# Patient Record
Sex: MALE | Race: WHITE | Employment: FULL TIME | URBAN - METROPOLITAN AREA
[De-identification: names, ages, dates, MRNs, and addresses within clinical notes are randomized per-mention and may not be internally consistent; named-entity substitution may affect disease eponyms.]

---

## 2020-01-02 ENCOUNTER — HOSPITAL ENCOUNTER (EMERGENCY)
Age: 45
Discharge: HOME | End: 2020-01-03
Attending: EMERGENCY MEDICINE
Payer: COMMERCIAL

## 2020-01-02 ENCOUNTER — APPOINTMENT (OUTPATIENT)
Dept: CT IMAGING | Age: 45
End: 2020-01-02
Attending: EMERGENCY MEDICINE
Payer: COMMERCIAL

## 2020-01-02 VITALS
HEART RATE: 104 BPM | RESPIRATION RATE: 18 BRPM | SYSTOLIC BLOOD PRESSURE: 121 MMHG | OXYGEN SATURATION: 93 % | WEIGHT: 235 LBS | TEMPERATURE: 97.3 F | HEIGHT: 74 IN | DIASTOLIC BLOOD PRESSURE: 77 MMHG | BODY MASS INDEX: 30.16 KG/M2

## 2020-01-02 DIAGNOSIS — K76.9 HEPATIC LESION: ICD-10-CM

## 2020-01-02 DIAGNOSIS — M51.37 DDD (DEGENERATIVE DISC DISEASE), LUMBOSACRAL: Primary | ICD-10-CM

## 2020-01-02 LAB
AMORPH CRY URNS QL MICRO: ABNORMAL
APPEARANCE UR: ABNORMAL
BACTERIA URNS QL MICRO: NEGATIVE /HPF
BASOPHILS # BLD: 0 K/UL (ref 0–0.1)
BASOPHILS NFR BLD: 1 % (ref 0–1)
BILIRUB UR QL: NEGATIVE
COLOR UR: ABNORMAL
COMMENT, HOLDF: NORMAL
DIFFERENTIAL METHOD BLD: ABNORMAL
EOSINOPHIL # BLD: 0 K/UL (ref 0–0.4)
EOSINOPHIL NFR BLD: 1 % (ref 0–7)
EPITH CASTS URNS QL MICRO: ABNORMAL /LPF
ERYTHROCYTE [DISTWIDTH] IN BLOOD BY AUTOMATED COUNT: 12 % (ref 11.5–14.5)
GLUCOSE UR STRIP.AUTO-MCNC: NEGATIVE MG/DL
HCT VFR BLD AUTO: 47.9 % (ref 36.6–50.3)
HGB BLD-MCNC: 16.8 G/DL (ref 12.1–17)
HGB UR QL STRIP: ABNORMAL
IMM GRANULOCYTES # BLD AUTO: 0 K/UL (ref 0–0.04)
IMM GRANULOCYTES NFR BLD AUTO: 0 % (ref 0–0.5)
KETONES UR QL STRIP.AUTO: NEGATIVE MG/DL
LEUKOCYTE ESTERASE UR QL STRIP.AUTO: NEGATIVE
LYMPHOCYTES # BLD: 1.5 K/UL (ref 0.8–3.5)
LYMPHOCYTES NFR BLD: 28 % (ref 12–49)
MCH RBC QN AUTO: 30.8 PG (ref 26–34)
MCHC RBC AUTO-ENTMCNC: 35.1 G/DL (ref 30–36.5)
MCV RBC AUTO: 87.7 FL (ref 80–99)
MONOCYTES # BLD: 0.7 K/UL (ref 0–1)
MONOCYTES NFR BLD: 13 % (ref 5–13)
NEUTS SEG # BLD: 3.2 K/UL (ref 1.8–8)
NEUTS SEG NFR BLD: 58 % (ref 32–75)
NITRITE UR QL STRIP.AUTO: NEGATIVE
NRBC # BLD: 0 K/UL (ref 0–0.01)
NRBC BLD-RTO: 0 PER 100 WBC
PH UR STRIP: 5 [PH] (ref 5–8)
PLATELET # BLD AUTO: 135 K/UL (ref 150–400)
PROT UR STRIP-MCNC: NEGATIVE MG/DL
RBC # BLD AUTO: 5.46 M/UL (ref 4.1–5.7)
RBC #/AREA URNS HPF: ABNORMAL /HPF (ref 0–5)
SAMPLES BEING HELD,HOLD: NORMAL
SP GR UR REFRACTOMETRY: 1.03 (ref 1–1.03)
UA: UC IF INDICATED,UAUC: ABNORMAL
UROBILINOGEN UR QL STRIP.AUTO: 0.2 EU/DL (ref 0.2–1)
WBC # BLD AUTO: 5.5 K/UL (ref 4.1–11.1)
WBC URNS QL MICRO: ABNORMAL /HPF (ref 0–4)

## 2020-01-02 PROCEDURE — 80053 COMPREHEN METABOLIC PANEL: CPT

## 2020-01-02 PROCEDURE — 96375 TX/PRO/DX INJ NEW DRUG ADDON: CPT

## 2020-01-02 PROCEDURE — 36415 COLL VENOUS BLD VENIPUNCTURE: CPT

## 2020-01-02 PROCEDURE — 96374 THER/PROPH/DIAG INJ IV PUSH: CPT

## 2020-01-02 PROCEDURE — 99282 EMERGENCY DEPT VISIT SF MDM: CPT

## 2020-01-02 PROCEDURE — 85025 COMPLETE CBC W/AUTO DIFF WBC: CPT

## 2020-01-02 PROCEDURE — 74176 CT ABD & PELVIS W/O CONTRAST: CPT

## 2020-01-02 PROCEDURE — 81001 URINALYSIS AUTO W/SCOPE: CPT

## 2020-01-02 RX ORDER — KETOROLAC TROMETHAMINE 30 MG/ML
30 INJECTION, SOLUTION INTRAMUSCULAR; INTRAVENOUS
Status: COMPLETED | OUTPATIENT
Start: 2020-01-02 | End: 2020-01-03

## 2020-01-02 RX ORDER — HYDROMORPHONE HYDROCHLORIDE 1 MG/ML
0.5 INJECTION, SOLUTION INTRAMUSCULAR; INTRAVENOUS; SUBCUTANEOUS
Status: COMPLETED | OUTPATIENT
Start: 2020-01-02 | End: 2020-01-03

## 2020-01-02 RX ORDER — DIAZEPAM 10 MG/2ML
2 INJECTION INTRAMUSCULAR
Status: COMPLETED | OUTPATIENT
Start: 2020-01-02 | End: 2020-01-03

## 2020-01-03 LAB
ALBUMIN SERPL-MCNC: 4 G/DL (ref 3.5–5)
ALBUMIN/GLOB SERPL: 1.1 {RATIO} (ref 1.1–2.2)
ALP SERPL-CCNC: 104 U/L (ref 45–117)
ALT SERPL-CCNC: 55 U/L (ref 12–78)
ANION GAP SERPL CALC-SCNC: 5 MMOL/L (ref 5–15)
AST SERPL-CCNC: 28 U/L (ref 15–37)
BILIRUB SERPL-MCNC: 0.5 MG/DL (ref 0.2–1)
BUN SERPL-MCNC: 10 MG/DL (ref 6–20)
BUN/CREAT SERPL: 9 (ref 12–20)
CALCIUM SERPL-MCNC: 8.7 MG/DL (ref 8.5–10.1)
CHLORIDE SERPL-SCNC: 107 MMOL/L (ref 97–108)
CO2 SERPL-SCNC: 25 MMOL/L (ref 21–32)
CREAT SERPL-MCNC: 1.17 MG/DL (ref 0.7–1.3)
GLOBULIN SER CALC-MCNC: 3.5 G/DL (ref 2–4)
GLUCOSE SERPL-MCNC: 101 MG/DL (ref 65–100)
POTASSIUM SERPL-SCNC: 3.7 MMOL/L (ref 3.5–5.1)
PROT SERPL-MCNC: 7.5 G/DL (ref 6.4–8.2)
SODIUM SERPL-SCNC: 137 MMOL/L (ref 136–145)

## 2020-01-03 PROCEDURE — 74011250636 HC RX REV CODE- 250/636: Performed by: EMERGENCY MEDICINE

## 2020-01-03 RX ORDER — CYCLOBENZAPRINE HCL 10 MG
10 TABLET ORAL
Qty: 30 TAB | Refills: 0 | Status: SHIPPED | OUTPATIENT
Start: 2020-01-03

## 2020-01-03 RX ORDER — HYDROCODONE BITARTRATE AND ACETAMINOPHEN 5; 325 MG/1; MG/1
1-2 TABLET ORAL
Qty: 18 TAB | Refills: 0 | Status: SHIPPED | OUTPATIENT
Start: 2020-01-03 | End: 2020-01-10

## 2020-01-03 RX ORDER — IBUPROFEN 800 MG/1
800 TABLET ORAL
Qty: 30 TAB | Refills: 0 | Status: SHIPPED | OUTPATIENT
Start: 2020-01-03

## 2020-01-03 RX ADMIN — HYDROMORPHONE HYDROCHLORIDE 0.5 MG: 1 INJECTION, SOLUTION INTRAMUSCULAR; INTRAVENOUS; SUBCUTANEOUS at 00:18

## 2020-01-03 RX ADMIN — DIAZEPAM 2 MG: 5 INJECTION, SOLUTION INTRAMUSCULAR; INTRAVENOUS at 00:16

## 2020-01-03 RX ADMIN — SODIUM CHLORIDE 1000 ML: 900 INJECTION, SOLUTION INTRAVENOUS at 00:19

## 2020-01-03 RX ADMIN — KETOROLAC TROMETHAMINE 30 MG: 30 INJECTION, SOLUTION INTRAMUSCULAR at 00:14

## 2020-01-03 NOTE — ED TRIAGE NOTES
Triage: Lower left sided back pain that started yesterday. Denies any injury or falling. Denies N/T. Also reports that when he urinates the stream is less than normal\". Took a hydrocodone and a muscle relaxer reports it helped some but the pain came back again. Denies Hx of kidney stones.

## 2020-01-03 NOTE — DISCHARGE INSTRUCTIONS
Patient Education        Learning About Degenerative Disc Disease  What is degenerative disc disease? Degenerative disc disease is not really a disease. It's a term used to describe the normal changes in your spinal discs as you age. Spinal discs are small, spongy discs that separate the bones (vertebrae) that make up the spine. The discs act as shock absorbers for the spine, so it can flex, bend, and twist.  Degenerative disc disease can take place in one or more places along the spine. It most often occurs in the discs in the lower back and the neck. What causes it? As we age, our spinal discs break down, or degenerate. This breakdown causes the symptoms of degenerative disc disease in some people. When the discs break down, they can lose fluid and dry out, and their outer layers can have tiny cracks or tears. This leads to less padding and less space between the bones in the spine. The body reacts to this by making bony growths on the spine called bone spurs. These spurs can press on the spinal nerve roots or spinal cord. This can cause pain and can affect how well the nerves work. What are the symptoms? Many people with degenerative disc disease have no pain. But others have severe pain or other symptoms that limit their activities. Some of the most common symptoms are:  · Pain in the back or neck. Where the pain occurs depends on which discs are affected. · Pain that gets worse when you move, such as bending over, reaching up, or twisting. · Pain that may occur in the rear end (buttocks), arm, or leg if a nerve is pinched. · Numbness or tingling in your arm or leg. How is it diagnosed? A doctor can often diagnose degenerative disc disease while doing a physical exam. If your exam shows no signs of a serious condition, imaging tests (such as an X-ray) aren't likely to help your doctor find the cause of your symptoms.   Sometimes degenerative disc disease is found when an X-ray is taken for another reason, such as an injury or other health problem. But even if the doctor finds degenerative disc disease, that doesn't always mean that you will have symptoms. How is it treated? There are several things you can do at home to manage pain from this problem. · To relieve pain, use ice or heat (whichever feels better) on the affected area. ¨ Put ice or a cold pack on the area for 10 to 20 minutes at a time. Put a thin cloth between the ice and your skin. ¨ Put a warm water bottle, a heating pad set on low, or a warm cloth on your back. Put a thin cloth between the heating pad and your skin. Do not go to sleep with a heating pad on your skin. · Ask your doctor if you can take acetaminophen (such as Tylenol) or nonsteroidal anti-inflammatory drugs, such as ibuprofen or naproxen. Your doctor can prescribe stronger medicines if needed. Be safe with medicines. Read and follow all instructions on the label. · Get some exercise every day. Exercise is one of the best ways to help your back feel better and stay better. It's best to start each exercise slowly. You may notice a little soreness, and that's okay. But if an exercise makes your pain worse, stop doing it. Here are things you can try:  ¨ Walking. It's the simplest and maybe the best activity for your back. It gets your blood moving and helps your muscles stay strong. ¨ Exercises that gently stretch and strengthen your stomach, back, and leg muscles. The stronger those muscles are, the better they're able to protect your back. If you have constant or severe pain in your back or spine, you may need other treatments, such as physical therapy. In some cases, your doctor may suggest surgery. Follow-up care is a key part of your treatment and safety. Be sure to make and go to all appointments, and call your doctor if you are having problems. It's also a good idea to know your test results and keep a list of the medicines you take. Where can you learn more?   Go to http://jonathan-keron.info/. Enter A067 in the search box to learn more about \"Learning About Degenerative Disc Disease. \"  Current as of: March 21, 2017  Content Version: 11.5  © 9351-8699 [employer], [employer]. Care instructions adapted under license by [employer] (which disclaims liability or warranty for this information). If you have questions about a medical condition or this instruction, always ask your healthcare professional. [place] [address] any warranty or liability for your use of this information.

## 2020-01-03 NOTE — ED PROVIDER NOTES
40 y.o. male with no significant past medical history who presents from home with chief complaint of back pain. The patient reports onset of lower right side back pain that began this afternoon. He denies any injury or fall. He notes he was laying on the couch when his back began to hurt. Since onset, his pain has increased in severity. He describes the pain as a constant ache 8/10 that radiates to his right buttocks and along his right upper leg. He has taken hydrocodone, tramadol, and a muscle relaxer at home with minimal relief. There are no alleviating factors. He also notes decreased urinary output. He states he has been recently ill for the last three days. He denies a history of abdominal surgery. He specifically denies diarrhea, constipation, or hematuria. There are no other acute medical concerns at this time. Social hx: current smoker  Surgical Hx: not on file  NKDA    PCP: UNKNOWN    Note written by Michael Mckeon, as dictated by Jami Levine MD 11:09 PM        The history is provided by the patient. No  was used. History reviewed. No pertinent past medical history. History reviewed. No pertinent surgical history. History reviewed. No pertinent family history.     Social History     Socioeconomic History    Marital status:      Spouse name: Not on file    Number of children: Not on file    Years of education: Not on file    Highest education level: Not on file   Occupational History    Not on file   Social Needs    Financial resource strain: Not on file    Food insecurity:     Worry: Not on file     Inability: Not on file    Transportation needs:     Medical: Not on file     Non-medical: Not on file   Tobacco Use    Smoking status: Current Every Day Smoker     Packs/day: 0.50    Smokeless tobacco: Never Used   Substance and Sexual Activity    Alcohol use: Never     Frequency: Never    Drug use: Never    Sexual activity: Not on file Lifestyle    Physical activity:     Days per week: Not on file     Minutes per session: Not on file    Stress: Not on file   Relationships    Social connections:     Talks on phone: Not on file     Gets together: Not on file     Attends Baptism service: Not on file     Active member of club or organization: Not on file     Attends meetings of clubs or organizations: Not on file     Relationship status: Not on file    Intimate partner violence:     Fear of current or ex partner: Not on file     Emotionally abused: Not on file     Physically abused: Not on file     Forced sexual activity: Not on file   Other Topics Concern    Not on file   Social History Narrative    Not on file         ALLERGIES: Patient has no known allergies. Review of Systems   Gastrointestinal: Negative for constipation and diarrhea. Genitourinary: Positive for decreased urine volume. Negative for hematuria. Musculoskeletal: Positive for back pain. All other systems reviewed and are negative. Vitals:    01/02/20 2250   BP: 121/77   Pulse: (!) 104   Resp: 18   Temp: 97.3 °F (36.3 °C)   SpO2: 93%   Weight: 106.6 kg (235 lb)   Height: 6' 2\" (1.88 m)            Physical Exam  Vitals signs and nursing note reviewed. CONSTITUTIONAL: Well-appearing; well-nourished; in no apparent distress  HEAD: Normocephalic; atraumatic  EYES: PERRL; EOM intact; conjunctiva and sclera are clear bilaterally. ENT: No rhinorrhea; normal pharynx with no tonsillar hypertrophy; mucous membranes pink/moist, no erythema, no exudate. NECK: Supple; non-tender; no cervical lymphadenopathy  CARD: Normal S1, S2; no murmurs, rubs, or gallops. Regular rate and rhythm. RESP: Normal respiratory effort; breath sounds clear and equal bilaterally; no wheezes, rhonchi, or rales. ABD: Normal bowel sounds; non-distended; non-tender; no palpable organomegaly, no masses, no bruits.   Back Exam: Normal inspection; lumbosacral vertebral point tenderness, no CVA tenderness. Decreased range of motion secondary to pain. .  EXT: Normal ROM in all four extremities; non-tender to palpation; no swelling or deformity; distal pulses are normal, no edema. SKIN: Warm; dry; no rash. NEURO:Alert and oriented x 3, coherent, DORINDA-XII grossly intact, sensory and motor are non-focal.      MDM  Number of Diagnoses or Management Options  Diagnosis management comments: Assessment: 60-year-old male who presents to the ED with intermittent episodes of left flank and back pain. Differential diagnosis consist of obstructive uropathy, myofascial strain, lumbosacral disc disease, UTI    Plan: Lab/IV fluid/antiemetic/muscle relaxant/CT scan of the abdomen and pelvis CT scan of the lumbar spine/ /serial exam/ Monitor and Reevaluate. Amount and/or Complexity of Data Reviewed  Clinical lab tests: ordered and reviewed  Tests in the radiology section of CPT®: ordered and reviewed  Tests in the medicine section of CPT®: reviewed and ordered  Discussion of test results with the performing providers: yes  Decide to obtain previous medical records or to obtain history from someone other than the patient: yes  Obtain history from someone other than the patient: yes  Review and summarize past medical records: yes  Discuss the patient with other providers: yes  Independent visualization of images, tracings, or specimens: yes    Risk of Complications, Morbidity, and/or Mortality  Presenting problems: moderate  Diagnostic procedures: moderate  Management options: moderate    Patient Progress  Patient progress: stable         Procedures    Progress Note:   Pt has been reexamined by Arabella Atwood MD. Pt is feeling much better. Symptoms have improved. All available results have been reviewed with pt and any available family. Pt understands sx, dx, and tx in ED. Care plan has been outlined and questions have been answered. Pt is ready to go home.  Will send home on degenerative disc disease instruction prescription of. Motrin, Norco and Flexeril outpatient referral with PCP/oncologist for reevaluation and further treatment as needed. Written by Mercedes Cesar MD,9:40 AM    .   .

## 2020-01-06 ENCOUNTER — HOSPITAL ENCOUNTER (OUTPATIENT)
Dept: LAB | Age: 45
Discharge: HOME | End: 2020-01-06

## 2020-01-06 ENCOUNTER — OFFICE VISIT (OUTPATIENT)
Dept: ONCOLOGY | Age: 45
End: 2020-01-06

## 2020-01-06 VITALS
HEART RATE: 59 BPM | BODY MASS INDEX: 30.03 KG/M2 | SYSTOLIC BLOOD PRESSURE: 127 MMHG | HEIGHT: 74 IN | OXYGEN SATURATION: 95 % | WEIGHT: 234 LBS | TEMPERATURE: 97.6 F | RESPIRATION RATE: 16 BRPM | DIASTOLIC BLOOD PRESSURE: 94 MMHG

## 2020-01-06 DIAGNOSIS — D69.6 THROMBOCYTOPENIA (HCC): ICD-10-CM

## 2020-01-06 DIAGNOSIS — R20.2 NUMBNESS AND TINGLING OF BOTH FEET: ICD-10-CM

## 2020-01-06 DIAGNOSIS — R16.0 LIVER MASS: Primary | ICD-10-CM

## 2020-01-06 DIAGNOSIS — R20.0 NUMBNESS AND TINGLING OF BOTH FEET: ICD-10-CM

## 2020-01-06 DIAGNOSIS — R16.0 LIVER MASS: ICD-10-CM

## 2020-01-06 LAB
CEA SERPL-MCNC: 1.6 NG/ML
HBV SURFACE AB SER QL: NONREACTIVE
HBV SURFACE AB SER-ACNC: <3.1 MIU/ML
HBV SURFACE AG SER QL: <0.1 INDEX
HBV SURFACE AG SER QL: NEGATIVE
HCV AB SERPL QL IA: NONREACTIVE
HCV COMMENT,HCGAC: NORMAL
HIV 1+2 AB+HIV1 P24 AG SERPL QL IA: NONREACTIVE
HIV12 RESULT COMMENT, HHIVC: NORMAL
VIT B12 SERPL-MCNC: 644 PG/ML (ref 193–986)

## 2020-01-06 NOTE — PROGRESS NOTES
[id number] [facility] at [address] [place] [address]  [phone]    Hematology / Oncology Consult    Reason for Visit:   Prashant Morris is a 40 y.o. male who is seen in consultation at the request of Dr. Joe Valle for evaluation of liver masses. Hematology Oncology Treatment History:     Diagnosis: Pending    Stage: Pending    Pathology:     Prior Treatment: None    Current Treatment: pending  Treatment duration   Frequency of visits     History of Present Illness:   Prashant Morris is a 40 y.o. male who comes in for evaluation of liver lesions. Pt was evaluated in the ER on 1/2/20 for R lower back pain. CT imaging revealed multiple liver masses, largest one is 5.7cm. No n/v/d, constipation, fevers, chills. No hematuria, but does see occasional blood in his stools. He had a colonoscopy in 2019. Afterwards, he underwent banding of 3 hemorrhoids. Patient is travelling the next few days and will be back on Friday evening. He currently has no abdominal pain. He states he is currently recovering from flu from last week. Both of his kids had the flu first. Pt states his back pain is much better and may have been a pinched nerve. He denies any abdominal pain - just discomfort as he recovers from flu. EtOH: Heavy in his early 20s-30s - > 6 drinks daily x 10 yrs. Now only 3 drinks per week. No IVDA. Family History: Identical twin brother had thyroid cancer age early 35s, sister with thyroid cancer in her late 35s. No past medical history on file. No past surgical history on file. Social History     Tobacco Use    Smoking status: Current Every Day Smoker     Packs/day: 0.50    Smokeless tobacco: Never Used   Substance Use Topics    Alcohol use: Never     Frequency: Never      No family history on file. Current Outpatient Medications   Medication Sig    ibuprofen (MOTRIN) 800 mg tablet Take 1 Tab by mouth every six (6) hours as needed for Pain.     HYDROcodone-acetaminophen (NORCO) 5-325 mg per tablet Take 1-2 Tabs by mouth every eight (8) hours as needed for Pain for up to 7 days. Max Daily Amount: 6 Tabs.  cyclobenzaprine (FLEXERIL) 10 mg tablet Take 1 Tab by mouth three (3) times daily as needed for Muscle Spasm(s). No current facility-administered medications for this visit. No Known Allergies     Review of Systems: A complete review of systems was obtained, negative except as described above. Physical Exam:     Visit Vitals  BP (!) 127/94   Pulse (!) 59   Temp 97.6 °F (36.4 °C) (Oral)   Resp 16   Ht 6' 2\" (1.88 m)   Wt 234 lb (106.1 kg)   SpO2 95%   BMI 30.04 kg/m²     ECOG PS: 0  General: Well developed, no acute distress  Eyes: PERRLA, EOMI, anicteric sclerae  HENT: Atraumatic, OP clear, TMs intact without erythema  Neck: Supple  Lymphatic: No cervical, supraclavicular, axillary or inguinal adenopathy  Respiratory: CTAB, normal respiratory effort  CV: Normal rate, regular rhythm, no murmurs, no peripheral edema  GI: Soft, nontender, nondistended, no masses, no hepatomegaly, no splenomegaly  MS: Normal gait and station. Digits without clubbing or cyanosis. Skin: No rashes, ecchymoses, or petechiae. Normal temperature, turgor, and texture. Neuro/Psych: Alert, oriented. 5/5 strength in all 4 extremities. Appropriate affect, normal judgment/insight. Results:     Lab Results   Component Value Date/Time    WBC 5.5 01/02/2020 11:18 PM    HGB 16.8 01/02/2020 11:18 PM    HCT 47.9 01/02/2020 11:18 PM    PLATELET 118 (L) 98/75/9458 11:18 PM    MCV 87.7 01/02/2020 11:18 PM    ABS.  NEUTROPHILS 3.2 01/02/2020 11:18 PM     Lab Results   Component Value Date/Time    Sodium 137 01/02/2020 11:18 PM    Potassium 3.7 01/02/2020 11:18 PM    Chloride 107 01/02/2020 11:18 PM    CO2 25 01/02/2020 11:18 PM    Glucose 101 (H) 01/02/2020 11:18 PM    BUN 10 01/02/2020 11:18 PM    Creatinine 1.17 01/02/2020 11:18 PM    GFR est AA >60 01/02/2020 11:18 PM    GFR est non-AA >60 01/02/2020 11:18 PM    Calcium 8.7 01/02/2020 11:18 PM     Lab Results   Component Value Date/Time    Bilirubin, total 0.5 01/02/2020 11:18 PM    ALT (SGPT) 55 01/02/2020 11:18 PM    AST (SGOT) 28 01/02/2020 11:18 PM    Alk. phosphatase 104 01/02/2020 11:18 PM    Protein, total 7.5 01/02/2020 11:18 PM    Albumin 4.0 01/02/2020 11:18 PM    Globulin 3.5 01/02/2020 11:18 PM     No results found for: IRON, FE, TIBC, IBCT, PSAT, FERR    No results found for: B12LT, FOL, RBCF  No results found for: TSH, TSH2, TSH3, TSHP, TSHEXT  No results found for: HAMAT, HAAB, HABT, HAAT, HBSAG, HBSB, HBSAT, HBABN, HBCM, HBCAB, HBCAT, XBCABS, HBEAB, HBEAG, XHEPCS, [id number], 1950 [facility], HBCMLT, HBCLT, HBEBLT, [id number], [id number], [address] [place] [address], [id number], HBCMLT, [id number], HCGAT      Imaging:     CT abd/pelvis 1/2/20:  FINDINGS:   LUNG BASES: Clear. INCIDENTALLY IMAGED HEART AND MEDIASTINUM: Unremarkable. LIVER: Multiple masses with at least 3 lesions measuring 2.2 x 2.2 cm (2, 12),  2.8 x 3.1 cm (2, 14), and 4.4 x 5.7 cm (2, 19)  GALLBLADDER: Unremarkable. SPLEEN: No mass. PANCREAS: No mass or ductal dilatation. ADRENALS: Unremarkable. KIDNEYS/URETERS: No mass, calculus, or hydronephrosis. STOMACH: Unremarkable. SMALL BOWEL: No dilatation or wall thickening. COLON: No dilatation or wall thickening. APPENDIX: Unremarkable. PERITONEUM: No ascites or pneumoperitoneum. RETROPERITONEUM: No lymphadenopathy or aortic aneurysm. REPRODUCTIVE ORGANS: Prostate and seminal vesicles appear unremarkable. URINARY BLADDER: No mass or calculus. BONES: No destructive bone lesion. Mild degenerative spine change with no  fracture or aggressive lesion. ADDITIONAL COMMENTS: N/A  IMPRESSION: Multiple hepatic lesions suspicious for neoplasm. Incidentals as above. Assessment & Plan:   Reddy Robert is a 40 y.o. male comes in for evaluation of liver masses. 1. Liver masses: Multiple masses seen in liver with largest measuring 4.4 x 5.7cm. No other masses seen in abdomen.  Pt reports h/o EtOH abuse, but no cirrhotic morphology commented on during recent CT. This may be a primary [facility] [address]. and therefore, I recommend additional imaging and tumor markers before considering liver biopsy. -- Check CEA, CA 19-9, AFP, Hep B/C profiles, HIV  -- Chest CT with IV contrast  -- Liver MRI vs triple phase abdomen CT  -- Arrange for CT-guided liver biopsy if AFP not elevated. -- Return in 1 week for follow up  -- Genetic testing recommended given family h/o thyroid cancer? 2. Thrombocytopenia: Mild and may be due to recent viral illness. Checking Hep B/C profiles, HIV. May be due to liver dysfunction from the liver masses. 3. Numbness/tingling in feet: Will check B12. Emotional well being: Pt is coping well with his/her disease and has excellent support. I appreciate the opportunity to participate in Mr. Pastor tao.     Signed By: Bismark Taylor MD     January 6, 2020

## 2020-01-07 LAB — AFP-TM SERPL-MCNC: 2.2 NG/ML (ref 0–8.3)

## 2020-01-09 DIAGNOSIS — R16.0 LIVER MASS: Primary | ICD-10-CM

## 2020-01-09 LAB
AFP L3 MFR SERPL: NORMAL % (ref 0–9.9)
AFP SERPL-MCNC: 1.3 NG/ML (ref 0–8)
CANCER AG19-9 SERPL-ACNC: 11 U/ML (ref 0–35)
HBV CORE AB SERPL QL IA: NEGATIVE
HBV CORE IGM SERPL QL IA: NEGATIVE

## 2020-01-10 ENCOUNTER — TELEPHONE (OUTPATIENT)
Dept: ONCOLOGY | Age: 45
End: 2020-01-10

## 2020-01-10 NOTE — TELEPHONE ENCOUNTER
pts wife--Lanie-on HIPPA-left a vm message    Wants labs results-results were given to  but he didn't understand and he didn't know how to relay them to her

## 2020-01-13 NOTE — TELEPHONE ENCOUNTER
[facility] "[employer] at [place]  [phone]        01/13/20 12:39 PM Called patient's wife, Mariam Levine (okay per PHI on file). Advised that patient is scheduled for liver biopsy on Thursday, 01/16 at 11 AM at [facility]. Reviewed that patient should arrive to outpatient registration at 9:30 AM and should be NPO at midnight, as well as have a . Informed Lanie that Dr. Bowen Dixon will follow up on the MRI results on Tuesday to verify if patient needs to proceed with biopsy or not. Discussed that patient's follow up visit with Dr. Bowen Dixon may also change pending MRI results (currently scheduled for 01/17). Mariam Levine verbalized understanding and denies any further questions or concerns at this time.

## 2020-01-14 ENCOUNTER — HOSPITAL ENCOUNTER (OUTPATIENT)
Dept: CT IMAGING | Age: 45
Discharge: HOME | End: 2020-01-14
Attending: INTERNAL MEDICINE
Payer: COMMERCIAL

## 2020-01-14 ENCOUNTER — HOSPITAL ENCOUNTER (OUTPATIENT)
Dept: MRI IMAGING | Age: 45
Discharge: HOME | End: 2020-01-14
Attending: INTERNAL MEDICINE
Payer: COMMERCIAL

## 2020-01-14 VITALS — WEIGHT: 234 LBS | BODY MASS INDEX: 30.03 KG/M2 | HEIGHT: 74 IN

## 2020-01-14 DIAGNOSIS — R16.0 LIVER MASS: ICD-10-CM

## 2020-01-14 PROCEDURE — 77030021566

## 2020-01-14 PROCEDURE — 74011250636 HC RX REV CODE- 250/636: Performed by: RADIOLOGY

## 2020-01-14 PROCEDURE — 71260 CT THORAX DX C+: CPT

## 2020-01-14 PROCEDURE — 74183 MRI ABD W/O CNTR FLWD CNTR: CPT

## 2020-01-14 PROCEDURE — A9585 GADOBUTROL INJECTION: HCPCS | Performed by: RADIOLOGY

## 2020-01-14 PROCEDURE — 74011636320 HC RX REV CODE- 636/320: Performed by: RADIOLOGY

## 2020-01-14 RX ADMIN — IOPAMIDOL 100 ML: 612 INJECTION, SOLUTION INTRAVENOUS at 08:43

## 2020-01-14 RX ADMIN — GADOBUTROL 10 ML: 604.72 INJECTION INTRAVENOUS at 10:37

## 2020-01-14 NOTE — PROGRESS NOTES
Called patient and his wife, Bharati Chaidez. Advised, per Dr. Dk Hayward, that patient's MRI states that these look like hemangiomas and therefore, we have cancelled his liver biopsy. Informed patient that Dr. Dk Hayward will discuss MRI results in further detail at next office visit on 01/17, as well as when to repeat imaging. Patient and his wife verbalized understanding. No further questions or concerns at this time.

## 2020-01-14 NOTE — PROGRESS NOTES
The MRI actually states that these look like hemangiomas (benign lump in the liver consisting of blood vessels) rather than a malignant mass. Therefore, I recommend cancelling the liver biopsy and coming to see me on 1/17 for discussion of when to repeat imaging. Tapan Veronica, please call IR and cancel the CT-guided liver biopsy).

## 2020-01-15 NOTE — PROGRESS NOTES
[address] [facility] Oncology at [address] [place] [address]  [phone]    Hematology / Oncology Established Visit    Reason for Visit:   Alfonso Spurling is a 40 y.o. male who is seen in consultation at the request of Dr. Ranjan Lam for evaluation of liver masses. History of Present Illness:   Alfonso Spurling is a 40 y.o. male who comes in for evaluation of liver lesions. Pt was evaluated in the ER on 1/2/20 for R lower back pain. CT imaging revealed multiple liver masses, largest one is 5.7cm. No n/v/d, constipation, fevers, chills. No hematuria, but does see occasional blood in his stools. He had a colonoscopy in 2019. Afterwards, he underwent banding of 3 hemorrhoids. Patient is travelling the next few days and will be back on Friday evening. He currently has no abdominal pain. He states he is currently recovering from flu from last week. Both of his kids had the flu first. Pt states his back pain is much better and may have been a pinched nerve. He denies any abdominal pain - just discomfort as he recovers from flu. SHx: EtOH - heavy in his early 20s-30s - > 6 drinks daily x 10 yrs. Now only 3 drinks per week. No IVDA. Family History: Identical twin brother had thyroid cancer age early 35s, sister with thyroid cancer in her late 35s. Interval History: Patient comes in for review of liver mass. He states the RUQ cramping has occurred 3-4 times in the last 2 months. He is unsure of associated factors. Past Medical History:   Diagnosis Date    Chronic pain       No past surgical history on file. Social History     Tobacco Use    Smoking status: Current Every Day Smoker     Packs/day: 0.50     Years: 24.00     Pack years: 12.00    Smokeless tobacco: Never Used   Substance Use Topics    Alcohol use:  Yes     Alcohol/week: 6.0 standard drinks     Types: 3 Shots of liquor, 3 Cans of beer per week     Frequency: Never      Family History   Problem Relation Age of Onset    Diabetes Father    Mindi Dunn Hypertension Father     Psychiatric Disorder Maternal Grandmother      Current Outpatient Medications   Medication Sig    ibuprofen (MOTRIN) 800 mg tablet Take 1 Tab by mouth every six (6) hours as needed for Pain.  cyclobenzaprine (FLEXERIL) 10 mg tablet Take 1 Tab by mouth three (3) times daily as needed for Muscle Spasm(s). No current facility-administered medications for this visit. No Known Allergies     Review of Systems: A complete review of systems was obtained, negative except as described above. Physical Exam:     Visit Vitals  /76   Pulse 85   Resp 16   Ht 6' 2\" (1.88 m)   Wt 243 lb (110.2 kg)   SpO2 95%   BMI 31.20 kg/m²     ECOG PS: 0  General: Well developed, no acute distress  Eyes: PERRLA, EOMI, anicteric sclerae  HENT: Atraumatic, OP clear, TMs intact without erythema  Neck: Supple  Lymphatic: No cervical, supraclavicular, axillary or inguinal adenopathy  Respiratory: CTAB, normal respiratory effort  CV: Normal rate, regular rhythm, no murmurs, no peripheral edema  GI: Soft, nontender, nondistended, no masses, no hepatomegaly, no splenomegaly  MS: Normal gait and station. Digits without clubbing or cyanosis. Skin: No rashes, ecchymoses, or petechiae. Normal temperature, turgor, and texture. Neuro/Psych: Alert, oriented. 5/5 strength in all 4 extremities. Appropriate affect, normal judgment/insight. Results:     Lab Results   Component Value Date/Time    WBC 5.5 01/02/2020 11:18 PM    HGB 16.8 01/02/2020 11:18 PM    HCT 47.9 01/02/2020 11:18 PM    PLATELET 572 (L) 61/98/4905 11:18 PM    MCV 87.7 01/02/2020 11:18 PM    ABS.  NEUTROPHILS 3.2 01/02/2020 11:18 PM     Lab Results   Component Value Date/Time    Sodium 137 01/02/2020 11:18 PM    Potassium 3.7 01/02/2020 11:18 PM    Chloride 107 01/02/2020 11:18 PM    CO2 25 01/02/2020 11:18 PM    Glucose 101 (H) 01/02/2020 11:18 PM    BUN 10 01/02/2020 11:18 PM    Creatinine 1.17 01/02/2020 11:18 PM    GFR est AA >60 01/02/2020 11:18 PM    GFR est non-AA >60 01/02/2020 11:18 PM    Calcium 8.7 01/02/2020 11:18 PM     Lab Results   Component Value Date/Time    Bilirubin, total 0.5 01/02/2020 11:18 PM    ALT (SGPT) 55 01/02/2020 11:18 PM    AST (SGOT) 28 01/02/2020 11:18 PM    Alk. phosphatase 104 01/02/2020 11:18 PM    Protein, total 7.5 01/02/2020 11:18 PM    Albumin 4.0 01/02/2020 11:18 PM    Globulin 3.5 01/02/2020 11:18 PM     No results found for: IRON, FE, TIBC, IBCT, PSAT, FERR    Lab Results   Component Value Date/Time    Vitamin B12 644 01/06/2020 03:43 PM     No results found for: TSH, TSH2, TSH3, TSHP, TSHEXT, TSHEXT  Lab Results   Component Value Date/Time    Hepatitis B surface Ag <0.10 01/06/2020 03:43 PM    Hepatitis B surface Ab <3.10 01/06/2020 03:43 PM    Hep B Core Ab, IgM NEGATIVE  01/06/2020 03:43 PM    Hep B Core Ab, total NEGATIVE  01/06/2020 03:43 PM         Imaging:     CT abd/pelvis 1/2/20:  FINDINGS:   LUNG BASES: Clear. INCIDENTALLY IMAGED HEART AND MEDIASTINUM: Unremarkable. LIVER: Multiple masses with at least 3 lesions measuring 2.2 x 2.2 cm (2, 12),  2.8 x 3.1 cm (2, 14), and 4.4 x 5.7 cm (2, 19)  GALLBLADDER: Unremarkable. SPLEEN: No mass. PANCREAS: No mass or ductal dilatation. ADRENALS: Unremarkable. KIDNEYS/URETERS: No mass, calculus, or hydronephrosis. STOMACH: Unremarkable. SMALL BOWEL: No dilatation or wall thickening. COLON: No dilatation or wall thickening. APPENDIX: Unremarkable. PERITONEUM: No ascites or pneumoperitoneum. RETROPERITONEUM: No lymphadenopathy or aortic aneurysm. REPRODUCTIVE ORGANS: Prostate and seminal vesicles appear unremarkable. URINARY BLADDER: No mass or calculus. BONES: No destructive bone lesion. Mild degenerative spine change with no  fracture or aggressive lesion. ADDITIONAL COMMENTS: N/A  IMPRESSION: Multiple hepatic lesions suspicious for neoplasm. Incidentals as above. 1/6/20 Chest CT: IMPRESSION:  No evidence of pulmonary mass lesion.  Hepatic hypodensities are most consistent with hemangiomas. 1/6/20 Liver MRI:  FINDINGS:  There are numerous T2 mildly hyperintense foci demonstrated throughout the  hepatic parenchyma. These all demonstrated pattern of central pedal enhancement  on multiphasic imaging and are consistent with hemangiomas. No significant  hepatic parenchymal distortion is identified. .  Small gallbladder polyp. There  is a small splenule. .  The spleen is normal.  The pancreas is normal.  The  adrenal glands are normal.  Renal hypointense foci are consistent with simple  Cyst. There is no intra or extrahepatic biliary ductal dilatation. No biliary  strictures or filling defects are seen. There is no pancreas divisum. There is no lymphadenopathy or ascites. IMPRESSION:   There are multiple hepatic hemangiomas. Other incidental findings are as described above. Assessment & Plan:   Clinton Quiros is a 40 y.o. male comes in for evaluation of liver masses. 1. Hepatic hemangiomas: Initial CT on 1/2/20 was concerning for possible malignancy. However, based on further evaluation with liver MRI, these all appear to be benign hemangiomas. Given the size > 5cm of the largest lesion, it is recommended to repeat imaging with liver protocol CT in 6-12 months. If these cause pain, patient can be referred to a surgeon. Tumor markers were all negative. -- Return in 9 months with repeat CT (liver protocol)  -- Consider referral to surgeon if pt develops persistent RUQ pain    Management of hemangioma per UpToDate:  Asymptomatic patients -- Asymptomatic patients with hepatic hemangioma do not require treatment, and they are observed for the development of symptoms. The approach to surveillance imaging is based on hemangioma size:  ??5 cm: No further imaging  ?>5 cm: Contrast-enhanced magnetic resonance imaging (MRI) in 6 to 12 months:  If the lesion size remains stable on surveillance imaging (ie, growth rate ? 3 mm per year), no further imaging is performed. If the lesion appears to be growing at a rate >3 mm per year, we repeat surveillance with contrast-enhanced MRI in 6 to 12 months. If the lesion appears stable, no further imaging is performed. If the lesion continues to grow at a rate of >3 mm per year, the patient is evaluated by a multidisciplinary team (eg, hepatologist, hepatobiliary surgeon) for consideration of surgical intervention. While a specific growth rate and/or maximum lesion size is not a prerequisite for surgical intervention, the decision to intervene is usually based on symptom severity and lesion size. (See 'Symptomatic patients' below.)  Long-term follow-up of hemangiomas demonstrates that most lesions exhibit either slow or no growth and rarely develop complications      2. Thrombocytopenia: Mild and may be due to recent viral illness. Normal B12, negative Hep B/C profiles, HIV. 3. Numbness/tingling in feet: VitB12 level was normal.    Emotional well being: Pt is coping well with his/her disease and has excellent support. I appreciate the opportunity to participate in Mr. Laurie Mayer care.     Signed By: Jeimy Hernandez MD     January 17, 2020

## 2020-01-17 ENCOUNTER — OFFICE VISIT (OUTPATIENT)
Dept: ONCOLOGY | Age: 45
End: 2020-01-17

## 2020-01-17 VITALS
RESPIRATION RATE: 16 BRPM | BODY MASS INDEX: 31.18 KG/M2 | WEIGHT: 243 LBS | OXYGEN SATURATION: 95 % | HEART RATE: 85 BPM | SYSTOLIC BLOOD PRESSURE: 117 MMHG | DIASTOLIC BLOOD PRESSURE: 76 MMHG | HEIGHT: 74 IN

## 2020-01-17 DIAGNOSIS — R20.0 NUMBNESS AND TINGLING OF BOTH FEET: ICD-10-CM

## 2020-01-17 DIAGNOSIS — D69.6 THROMBOCYTOPENIA (HCC): ICD-10-CM

## 2020-01-17 DIAGNOSIS — R20.2 NUMBNESS AND TINGLING OF BOTH FEET: ICD-10-CM

## 2020-01-17 DIAGNOSIS — D18.03 HEPATIC HEMANGIOMA: Primary | ICD-10-CM

## 2020-09-18 ENCOUNTER — TELEPHONE (OUTPATIENT)
Dept: ONCOLOGY | Age: 45
End: 2020-09-18

## 2020-09-18 NOTE — TELEPHONE ENCOUNTER
[address] at [place]  [phone]        09/18/20 2:50 PM Spoke with patient and his wife, Chris Cordova. Patient has had four episodes of \"extreme\" pain that started 2 weeks ago, most recent episode occurred last night and impacted patient's sleep. Patient states pain occurs in back and develops discomfort in RUQ. Patient becomes diaphoretic. Denies nausea/vomiting. Patient also mentioned that he burps during these episodes of pain. Rates pain as a 7 out of 10. Patient usually subsides in an hour, after taking hydrocodone and flexeril. Patient has seen a chiropractor and PT. Advised this nurse would forward above to provider and call patient back with recommendations. He verbalized understanding. 3:40 PM Called patient and wife and notified of Reina's, NP, recommendations. Patient agreeable. Rescheduled appointment for 09/25 at 9:30 AM. Patient preferred that  contact him directly--message sent to [address] with this request. No further questions or concerns at this time.

## 2020-09-18 NOTE — TELEPHONE ENCOUNTER
Wife Sally Mcrae left a vm message about the status of is appt should they move them around because of his increased pain

## 2020-09-21 ENCOUNTER — TELEPHONE (OUTPATIENT)
Dept: ONCOLOGY | Age: 45
End: 2020-09-21

## 2020-09-21 DIAGNOSIS — D18.03 HEPATIC HEMANGIOMA: Primary | ICD-10-CM

## 2020-09-21 NOTE — TELEPHONE ENCOUNTER
Layla preston---Bony--- left a  ---asking if Dr damian can do a peer to peer review on this pt for his Ct Scan

## 2020-09-22 ENCOUNTER — HOSPITAL ENCOUNTER (OUTPATIENT)
Dept: CT IMAGING | Age: 45
Discharge: HOME | End: 2020-09-22
Attending: INTERNAL MEDICINE
Payer: COMMERCIAL

## 2020-09-22 DIAGNOSIS — D18.03 HEPATIC HEMANGIOMA: ICD-10-CM

## 2020-09-22 PROCEDURE — 74170 CT ABD WO CNTRST FLWD CNTRST: CPT

## 2020-09-22 PROCEDURE — 74011000636 HC RX REV CODE- 636: Performed by: RADIOLOGY

## 2020-09-22 RX ADMIN — IOPAMIDOL 100 ML: 755 INJECTION, SOLUTION INTRAVENOUS at 08:00

## 2020-09-23 NOTE — PROGRESS NOTES
[address] [facility] Oncology at [facility]  [phone]    Hematology / Oncology Established Visit    Reason for Visit:   Sally [place] is a 39 y.o. male who is seen for follow up of liver masses. History of Present Illness:   Sally [place] is a 39 y.o. male who comes in for f/u of liver lesions. Pt was evaluated in the ER on 1/2/20 for R lower back pain. CT imaging revealed multiple liver masses, largest one is 5.7cm. No n/v/d, constipation, fevers, chills. No hematuria, but does see occasional blood in his stools. He had a colonoscopy in 2019. Afterwards, he underwent banding of 3 hemorrhoids. Patient is travelling the next few days and will be back on Friday evening. He currently has no abdominal pain. He states he is currently recovering from flu from last week. Both of his kids had the flu first. Pt states his back pain is much better and may have been a pinched nerve. He denies any abdominal pain - just discomfort as he recovers from flu. SHx: EtOH - heavy in his early 20s-30s - > 6 drinks daily x 10 yrs. Now only 3 drinks per week. No IVDA. Family History: Identical twin brother had thyroid cancer age early 35s, sister with thyroid cancer in her late 35s. Interval History: Patient comes in for follow up of liver mass. He completed an CT abd liver protocol this week. Patient had four episodes of \"extreme\" pain that started 2 weeks ago. Pain on R side over the last 2 weeks, associated with cramping that is on and off. Also had gas and belching which is new for patient. He is having daily BMs. No diarrhea or dietary changes. He states he even felt warmth overlying the R side when he had pain. He also report chronic back pain for which he sees a chiropracter. Has had new back spasms and is currently working with PT for this. He took Flexeril and Hydrocodone that was prescribed from a past ER visit, which helped.      Past Medical History:   Diagnosis Date    Chronic pain       No past surgical history on file. Social History     Tobacco Use    Smoking status: Current Every Day Smoker     Packs/day: 0.50     Years: 24.00     Pack years: 12.00    Smokeless tobacco: Never Used   Substance Use Topics    Alcohol use: Yes     Alcohol/week: 6.0 standard drinks     Types: 3 Shots of liquor, 3 Cans of beer per week     Frequency: Never      Family History   Problem Relation Age of Onset    Diabetes Father     Hypertension Father     Psychiatric Disorder Maternal Grandmother      Current Outpatient Medications   Medication Sig    ibuprofen (MOTRIN) 800 mg tablet Take 1 Tab by mouth every six (6) hours as needed for Pain.  cyclobenzaprine (FLEXERIL) 10 mg tablet Take 1 Tab by mouth three (3) times daily as needed for Muscle Spasm(s). No current facility-administered medications for this visit. No Known Allergies     Review of Systems: A complete review of systems was obtained, negative except as described above. Physical Exam:     Visit Vitals  /80 (BP 1 Location: Left arm, BP Patient Position: Sitting)   Pulse 89   Temp 97 °F (36.1 °C) (Temporal)   Ht 6' 2\" (1.88 m)   Wt 232 lb (105.2 kg)   SpO2 97%   BMI 29.79 kg/m²     ECOG PS: 0  General: Well developed, no acute distress  Eyes: PERRLA, EOMI, anicteric sclerae  HENT: Atraumatic, OP clear, TMs intact without erythema  Neck: Supple  Lymphatic: No cervical, supraclavicular, axillary or inguinal adenopathy  Respiratory: CTAB, normal respiratory effort  CV: Normal rate, regular rhythm, no murmurs, no peripheral edema  GI: Soft, nontender, nondistended, no masses, no hepatomegaly, no splenomegaly  MS: Normal gait and station. Digits without clubbing or cyanosis. Skin: No rashes, ecchymoses, or petechiae. Normal temperature, turgor, and texture. Neuro/Psych: Alert, oriented. 5/5 strength in all 4 extremities. Appropriate affect, normal judgment/insight.     Results:     Lab Results   Component Value Date/Time    WBC 5.5 01/02/2020 11:18 PM    HGB 16.8 01/02/2020 11:18 PM    HCT 47.9 01/02/2020 11:18 PM    PLATELET 901 (L) 83/97/1495 11:18 PM    MCV 87.7 01/02/2020 11:18 PM    ABS. NEUTROPHILS 3.2 01/02/2020 11:18 PM     Lab Results   Component Value Date/Time    Sodium 137 01/02/2020 11:18 PM    Potassium 3.7 01/02/2020 11:18 PM    Chloride 107 01/02/2020 11:18 PM    CO2 25 01/02/2020 11:18 PM    Glucose 101 (H) 01/02/2020 11:18 PM    BUN 10 01/02/2020 11:18 PM    Creatinine 1.17 01/02/2020 11:18 PM    GFR est AA >60 01/02/2020 11:18 PM    GFR est non-AA >60 01/02/2020 11:18 PM    Calcium 8.7 01/02/2020 11:18 PM     Lab Results   Component Value Date/Time    Bilirubin, total 0.5 01/02/2020 11:18 PM    ALT (SGPT) 55 01/02/2020 11:18 PM    Alk. phosphatase 104 01/02/2020 11:18 PM    Protein, total 7.5 01/02/2020 11:18 PM    Albumin 4.0 01/02/2020 11:18 PM    Globulin 3.5 01/02/2020 11:18 PM     No results found for: IRON, FE, TIBC, IBCT, PSAT, FERR    Lab Results   Component Value Date/Time    Vitamin B12 644 01/06/2020 03:43 PM     No results found for: TSH, TSH2, TSH3, TSHP, TSHEXT, TSHEXT  Lab Results   Component Value Date/Time    Hepatitis B surface Ag <0.10 01/06/2020 03:43 PM    Hepatitis B surface Ab <3.10 01/06/2020 03:43 PM    Hep B Core Ab, IgM NEGATIVE  01/06/2020 03:43 PM    Hep B Core Ab, total NEGATIVE  01/06/2020 03:43 PM         Imaging:     CT abd/pelvis 1/2/20:  FINDINGS:   LUNG BASES: Clear. INCIDENTALLY IMAGED HEART AND MEDIASTINUM: Unremarkable. LIVER: Multiple masses with at least 3 lesions measuring 2.2 x 2.2 cm (2, 12),  2.8 x 3.1 cm (2, 14), and 4.4 x 5.7 cm (2, 19)  GALLBLADDER: Unremarkable. SPLEEN: No mass. PANCREAS: No mass or ductal dilatation. ADRENALS: Unremarkable. KIDNEYS/URETERS: No mass, calculus, or hydronephrosis. STOMACH: Unremarkable. SMALL BOWEL: No dilatation or wall thickening. COLON: No dilatation or wall thickening. APPENDIX: Unremarkable.   PERITONEUM: No ascites or pneumoperitoneum. RETROPERITONEUM: No lymphadenopathy or aortic aneurysm. REPRODUCTIVE ORGANS: Prostate and seminal vesicles appear unremarkable. URINARY BLADDER: No mass or calculus. BONES: No destructive bone lesion. Mild degenerative spine change with no  fracture or aggressive lesion. ADDITIONAL COMMENTS: N/A  IMPRESSION: Multiple hepatic lesions suspicious for neoplasm. Incidentals as above. 1/6/20 Chest CT: IMPRESSION:  No evidence of pulmonary mass lesion. Hepatic hypodensities are most consistent with hemangiomas. 1/6/20 Liver MRI:  FINDINGS:  There are numerous T2 mildly hyperintense foci demonstrated throughout the  hepatic parenchyma. These all demonstrated pattern of central pedal enhancement  on multiphasic imaging and are consistent with hemangiomas. No significant  hepatic parenchymal distortion is identified. .  Small gallbladder polyp. There  is a small splenule. .  The spleen is normal.  The pancreas is normal.  The  adrenal glands are normal.  Renal hypointense foci are consistent with simple  Cyst. There is no intra or extrahepatic biliary ductal dilatation. No biliary  strictures or filling defects are seen. There is no pancreas divisum. There is no lymphadenopathy or ascites. IMPRESSION:   There are multiple hepatic hemangiomas. Other incidental findings are as described above. 9/22/20 CT abd w/wo contrast liver protocol:  FINDINGS:  LOWER THORAX: No significant abnormality in the incidentally imaged lower chest.  There are numerous hypodense foci demonstrated throughout the hepatic  parenchyma. These all demonstrated pattern of centripedal enhancement on  multiphasic imaging and are consistent with hemangiomas. No significant hepatic  parenchymal distortion is identified. Small gallbladder polyp. There is a  small splenule. .  The spleen is otherwise normal.  The pancreas is normal.  The  adrenal glands are normal.  Tiny left renal cyst..  Chronic vertebral compression deformities are unchanged in appearance. Abdominal  aorta is normal in caliber. There is no lymphadenopathy or ascites. IMPRESSION:   There are multiple stable hepatic hemangiomas. Other incidental findings are stable and as described above.       Assessment & Plan:   Kai Cordon is a 39 y.o. male comes in for evaluation of liver masses. 1. Hepatic hemangiomas: Initial CT on 1/2/20 was concerning for possible malignancy. However, based on further evaluation with liver MRI, these all appear to be benign hemangiomas. Given the size > 5cm of the largest lesion, we repeated imaging with liver protocol CT 8 months later. Imaging 9/22/20 shows stability and therefore, I do not feel the hemangiomas are responsible for his abdominal pain. Tumor markers were all negative. Patient can choose to see a surgeon if he continues to have pain or further questions about the liver hemangiomas. -- Discussed with patient he needs to get established with a PCP.  -- Offered referral to a surgeon to further discuss management of hemangiomas. Pt declines at time time. Management of hemangioma per UpToDate:  Asymptomatic patients  Asymptomatic patients with hepatic hemangioma do not require treatment, and they are observed for the development of symptoms. The approach to surveillance imaging is based on hemangioma size:  ??5 cm: No further imaging  ?>5 cm: Contrast-enhanced magnetic resonance imaging (MRI) in 6 to 12 months:  If the lesion size remains stable on surveillance imaging (ie, growth rate ? 3 mm per year), no further imaging is performed. If the lesion appears to be growing at a rate >3 mm per year, we repeat surveillance with contrast-enhanced MRI in 6 to 12 months. If the lesion appears stable, no further imaging is performed.    If the lesion continues to grow at a rate of >3 mm per year, the patient is evaluated by a multidisciplinary team (eg, hepatologist, hepatobiliary surgeon) for consideration of surgical intervention. While a specific growth rate and/or maximum lesion size is not a prerequisite for surgical intervention, the decision to intervene is usually based on symptom severity and lesion size. (See 'Symptomatic patients' below.)  Long-term follow-up of hemangiomas demonstrates that most lesions exhibit either slow or no growth and rarely develop complications    2. Thrombocytopenia: Mild and may be due to recent viral illness. Normal B12, negative Hep B/C profiles, HIV. 3. Numbness/tingling in feet: VitB12 level was normal.    4. RUQ pain: Patient has had four episodes of severe pain, associated with warmth and diaphoresis. Also has acute on chronic back pain, belching.   -- Offered referral to GI for possible endoscopy. Pt wants to wait to see if it resolves spontaneously. 5. Chronic back pain: Discussed chronic compression fractures seen on CT. Working with a chiropractor and PT. Emotional well being: Pt is coping well with his/her disease and has excellent support. I appreciate the opportunity to participate in [facility].     Signed By: Hyland Harada, MD     September 25, 2020

## 2020-09-25 ENCOUNTER — OFFICE VISIT (OUTPATIENT)
Dept: ONCOLOGY | Age: 45
End: 2020-09-25
Payer: COMMERCIAL

## 2020-09-25 VITALS
HEART RATE: 89 BPM | BODY MASS INDEX: 29.77 KG/M2 | WEIGHT: 232 LBS | HEIGHT: 74 IN | DIASTOLIC BLOOD PRESSURE: 80 MMHG | SYSTOLIC BLOOD PRESSURE: 119 MMHG | TEMPERATURE: 97 F | OXYGEN SATURATION: 97 %

## 2020-09-25 DIAGNOSIS — G89.29 CHRONIC MIDLINE THORACIC BACK PAIN: ICD-10-CM

## 2020-09-25 DIAGNOSIS — D18.03 HEPATIC HEMANGIOMA: Primary | ICD-10-CM

## 2020-09-25 DIAGNOSIS — D69.6 THROMBOCYTOPENIA (HCC): ICD-10-CM

## 2020-09-25 DIAGNOSIS — M54.6 CHRONIC MIDLINE THORACIC BACK PAIN: ICD-10-CM

## 2020-09-25 DIAGNOSIS — R14.2 BELCHING: ICD-10-CM

## 2020-09-25 DIAGNOSIS — R10.11 RUQ PAIN: ICD-10-CM

## 2020-09-25 PROCEDURE — 99214 OFFICE O/P EST MOD 30 MIN: CPT | Performed by: INTERNAL MEDICINE

## 2020-09-25 NOTE — PROGRESS NOTES
Mehran Caceres is a 39 y.o. male here for follow up of hepatic hemangioma. Patient with complaints of back pain, rates as a 3 out of 10. Visit Information    Have you changed or started any medications since your last visit including any over-the-counter medicines, vitamins, or herbal medicines? no   Have you stopped taking any of your medications? Is so, why? -  no  Are you having any side effects from any of your medications? - no    Have you seen any other physician or provider since your last visit?  no   Have you had any other diagnostic tests since your last visit?  no   Have you been seen in the emergency room and/or had an admission in a hospital since we last saw you?  no   Have you had your routine dental cleaning in the past 6 months?  no     Do you have an active MyChart account? If no, what is the barrier?   Yes    Patient Care Team:  Janet Anguiano MD as PCP - General (Family Medicine)  Hany Latham DO as Consulting Physician (Family Medicine)    Medical History Review  Past Medical, Family, and Social History reviewed and does contribute to the patient presenting condition    Health Maintenance   Topic Date Due    Hepatitis C screen  1961    Diabetic foot exam  08/09/1971    Diabetic retinal exam  08/09/1971    HIV screen  08/09/1976    Hepatitis B vaccine (1 of 3 - Risk 3-dose series) 08/09/1980    Shingles Vaccine (1 of 2) 08/09/2011    Colon cancer screen colonoscopy  08/09/2011    Low dose CT lung screening  08/09/2016    Flu vaccine (1) 01/03/2021 (Originally 9/1/2019)    Potassium monitoring  11/09/2020    Creatinine monitoring  11/09/2020    A1C test (Diabetic or Prediabetic)  11/21/2020    Lipid screen  11/21/2020    Diabetic microalbuminuria test  12/18/2020    DTaP/Tdap/Td vaccine (2 - Td) 12/18/2029    Pneumococcal 0-64 years Vaccine  Completed    Hepatitis A vaccine  Aged Out    Hib vaccine  Aged Out    Meningococcal (ACWY) vaccine  Aged Out

## 2020-11-10 ENCOUNTER — HOSPITAL ENCOUNTER (EMERGENCY)
Age: 45
Discharge: HOME | End: 2020-11-10
Attending: EMERGENCY MEDICINE
Payer: COMMERCIAL

## 2020-11-10 ENCOUNTER — APPOINTMENT (OUTPATIENT)
Dept: ULTRASOUND IMAGING | Age: 45
End: 2020-11-10
Attending: EMERGENCY MEDICINE
Payer: COMMERCIAL

## 2020-11-10 VITALS
SYSTOLIC BLOOD PRESSURE: 127 MMHG | TEMPERATURE: 97.6 F | HEART RATE: 100 BPM | OXYGEN SATURATION: 97 % | DIASTOLIC BLOOD PRESSURE: 64 MMHG | RESPIRATION RATE: 16 BRPM

## 2020-11-10 DIAGNOSIS — K80.20 GALL STONES: Primary | ICD-10-CM

## 2020-11-10 DIAGNOSIS — R10.11 RUQ ABDOMINAL PAIN: ICD-10-CM

## 2020-11-10 LAB
ALBUMIN SERPL-MCNC: 3.4 G/DL (ref 3.5–5)
ALBUMIN/GLOB SERPL: 0.9 {RATIO} (ref 1.1–2.2)
ALP SERPL-CCNC: 88 U/L (ref 45–117)
ALT SERPL-CCNC: 46 U/L (ref 12–78)
ANION GAP SERPL CALC-SCNC: 5 MMOL/L (ref 5–15)
AST SERPL-CCNC: 22 U/L (ref 15–37)
BASOPHILS # BLD: 0.1 K/UL (ref 0–0.1)
BASOPHILS NFR BLD: 1 % (ref 0–1)
BILIRUB SERPL-MCNC: 0.5 MG/DL (ref 0.2–1)
BUN SERPL-MCNC: 12 MG/DL (ref 6–20)
BUN/CREAT SERPL: 12 (ref 12–20)
CALCIUM SERPL-MCNC: 8.4 MG/DL (ref 8.5–10.1)
CHLORIDE SERPL-SCNC: 106 MMOL/L (ref 97–108)
CO2 SERPL-SCNC: 27 MMOL/L (ref 21–32)
COMMENT, HOLDF: NORMAL
CREAT SERPL-MCNC: 1 MG/DL (ref 0.7–1.3)
DIFFERENTIAL METHOD BLD: ABNORMAL
EOSINOPHIL # BLD: 0.3 K/UL (ref 0–0.4)
EOSINOPHIL NFR BLD: 3 % (ref 0–7)
ERYTHROCYTE [DISTWIDTH] IN BLOOD BY AUTOMATED COUNT: 11.6 % (ref 11.5–14.5)
GLOBULIN SER CALC-MCNC: 3.6 G/DL (ref 2–4)
GLUCOSE SERPL-MCNC: 116 MG/DL (ref 65–100)
HCT VFR BLD AUTO: 42.5 % (ref 36.6–50.3)
HGB BLD-MCNC: 14.8 G/DL (ref 12.1–17)
IMM GRANULOCYTES # BLD AUTO: 0 K/UL (ref 0–0.04)
IMM GRANULOCYTES NFR BLD AUTO: 0 % (ref 0–0.5)
LIPASE SERPL-CCNC: 139 U/L (ref 73–393)
LYMPHOCYTES # BLD: 1.8 K/UL (ref 0.8–3.5)
LYMPHOCYTES NFR BLD: 20 % (ref 12–49)
MCH RBC QN AUTO: 30.6 PG (ref 26–34)
MCHC RBC AUTO-ENTMCNC: 34.8 G/DL (ref 30–36.5)
MCV RBC AUTO: 87.8 FL (ref 80–99)
MONOCYTES # BLD: 0.8 K/UL (ref 0–1)
MONOCYTES NFR BLD: 9 % (ref 5–13)
NEUTS SEG # BLD: 6.1 K/UL (ref 1.8–8)
NEUTS SEG NFR BLD: 67 % (ref 32–75)
NRBC # BLD: 0 K/UL (ref 0–0.01)
NRBC BLD-RTO: 0 PER 100 WBC
PLATELET # BLD AUTO: 161 K/UL (ref 150–400)
PMV BLD AUTO: 13 FL (ref 8.9–12.9)
POTASSIUM SERPL-SCNC: 3.6 MMOL/L (ref 3.5–5.1)
PROT SERPL-MCNC: 7 G/DL (ref 6.4–8.2)
RBC # BLD AUTO: 4.84 M/UL (ref 4.1–5.7)
SAMPLES BEING HELD,HOLD: NORMAL
SODIUM SERPL-SCNC: 138 MMOL/L (ref 136–145)
WBC # BLD AUTO: 9 K/UL (ref 4.1–11.1)

## 2020-11-10 PROCEDURE — 99282 EMERGENCY DEPT VISIT SF MDM: CPT

## 2020-11-10 PROCEDURE — 74011250636 HC RX REV CODE- 250/636: Performed by: EMERGENCY MEDICINE

## 2020-11-10 PROCEDURE — 36415 COLL VENOUS BLD VENIPUNCTURE: CPT

## 2020-11-10 PROCEDURE — 80053 COMPREHEN METABOLIC PANEL: CPT

## 2020-11-10 PROCEDURE — 83690 ASSAY OF LIPASE: CPT

## 2020-11-10 PROCEDURE — 96374 THER/PROPH/DIAG INJ IV PUSH: CPT

## 2020-11-10 PROCEDURE — 76705 ECHO EXAM OF ABDOMEN: CPT

## 2020-11-10 PROCEDURE — 85025 COMPLETE CBC W/AUTO DIFF WBC: CPT

## 2020-11-10 RX ORDER — ONDANSETRON 4 MG/1
4 TABLET, ORALLY DISINTEGRATING ORAL
Qty: 20 TAB | Refills: 0 | Status: SHIPPED | OUTPATIENT
Start: 2020-11-10

## 2020-11-10 RX ORDER — DICYCLOMINE HYDROCHLORIDE 10 MG/1
10 CAPSULE ORAL
Qty: 20 CAP | Refills: 0 | Status: SHIPPED | OUTPATIENT
Start: 2020-11-10 | End: 2020-11-15

## 2020-11-10 RX ORDER — SODIUM CHLORIDE 0.9 % (FLUSH) 0.9 %
5-40 SYRINGE (ML) INJECTION AS NEEDED
Status: DISCONTINUED | OUTPATIENT
Start: 2020-11-10 | End: 2020-11-10 | Stop reason: HOSPADM

## 2020-11-10 RX ORDER — HYDROCODONE BITARTRATE AND ACETAMINOPHEN 5; 325 MG/1; MG/1
1 TABLET ORAL
Qty: 20 TAB | Refills: 0 | Status: SHIPPED | OUTPATIENT
Start: 2020-11-10 | End: 2020-11-15

## 2020-11-10 RX ORDER — KETOROLAC TROMETHAMINE 30 MG/ML
30 INJECTION, SOLUTION INTRAMUSCULAR; INTRAVENOUS
Status: COMPLETED | OUTPATIENT
Start: 2020-11-10 | End: 2020-11-10

## 2020-11-10 RX ORDER — SODIUM CHLORIDE 0.9 % (FLUSH) 0.9 %
5-40 SYRINGE (ML) INJECTION EVERY 8 HOURS
Status: DISCONTINUED | OUTPATIENT
Start: 2020-11-10 | End: 2020-11-10 | Stop reason: HOSPADM

## 2020-11-10 RX ADMIN — Medication 10 ML: at 02:24

## 2020-11-10 RX ADMIN — KETOROLAC TROMETHAMINE 30 MG: 30 INJECTION, SOLUTION INTRAMUSCULAR; INTRAVENOUS at 02:23

## 2020-11-10 NOTE — ED PROVIDER NOTES
This is a 27-year-old gentleman comes emergency room with chief complaint of right middle thoracic back pain. Patient states the pain has been intermittent over the past few months. Patient states his pain started tonight at 11:30 PM.  Patient states he took hydrocodone and a Flexeril. Patient states that this helped his patient help. Patient states that he has had no fever or chills. Patient denies any pain going down his legs. Patient states the pain wraps around the right middle posterior aspect of his back into his right upper quadrant of his abdomen. Patient also states that this pain sometimes happens approximately 2 to 3 hours after eating. The history is provided by the patient. No  was used. Back Pain    This is a recurrent problem. The current episode started 1 to 2 hours ago. The problem has been gradually improving. The problem occurs every several days. The pain is associated with no known injury. The pain is present in the middle back and right side. The quality of the pain is described as cramping. Radiates to: Right side of abdomen. The pain is at a severity of 3/10. The pain is mild. Associated symptoms include abdominal pain. Pertinent negatives include no chest pain, no fever, no numbness, no weight loss, no headaches, no abdominal swelling, no bowel incontinence, no perianal numbness, no bladder incontinence, no dysuria, no pelvic pain, no leg pain, no paresthesias, no paresis, no tingling and no weakness. He has tried analgesics and muscle relaxants for the symptoms. The treatment provided mild relief. The patient's surgical history non-contributory        Past Medical History:   Diagnosis Date    Chronic pain        No past surgical history on file.       Family History:   Problem Relation Age of Onset    Diabetes Father     Hypertension Father     Psychiatric Disorder Maternal Grandmother        Social History     Socioeconomic History    Marital status:      Spouse name: Not on file    Number of children: Not on file    Years of education: Not on file    Highest education level: Not on file   Occupational History    Not on file   Social Needs    Financial resource strain: Not on file    Food insecurity     Worry: Not on file     Inability: Not on file    Transportation needs     Medical: Not on file     Non-medical: Not on file   Tobacco Use    Smoking status: Current Every Day Smoker     Packs/day: 0.50     Years: 24.00     Pack years: 12.00    Smokeless tobacco: Never Used   Substance and Sexual Activity    Alcohol use: Yes     Alcohol/week: 6.0 standard drinks     Types: 3 Cans of beer, 3 Shots of liquor per week     Frequency: Never    Drug use: Never    Sexual activity: Not on file   Lifestyle    Physical activity     Days per week: Not on file     Minutes per session: Not on file    Stress: Not on file   Relationships    Social connections     Talks on phone: Not on file     Gets together: Not on file     Attends [employer] service: Not on file     Active member of club or organization: Not on file     Attends meetings of clubs or organizations: Not on file     Relationship status: Not on file    Intimate partner violence     Fear of current or ex partner: Not on file     Emotionally abused: Not on file     Physically abused: Not on file     Forced sexual activity: Not on file   Other Topics Concern    Not on file   Social History Narrative    Not on file     ALLERGIES: Patient has no known allergies. Review of Systems   Constitutional: Negative for appetite change, chills, fever, unexpected weight change and weight loss. HENT: Negative for ear pain, hearing loss, rhinorrhea and trouble swallowing. Eyes: Negative for pain and visual disturbance. Respiratory: Negative for cough, chest tightness and shortness of breath. Cardiovascular: Negative for chest pain and palpitations. Gastrointestinal: Positive for abdominal pain. Negative for abdominal distention, blood in stool, bowel incontinence and vomiting. Genitourinary: Negative for bladder incontinence, dysuria, hematuria, pelvic pain and urgency. Musculoskeletal: Positive for back pain. Negative for myalgias. Skin: Negative for rash. Neurological: Negative for dizziness, tingling, syncope, weakness, numbness, headaches and paresthesias. Psychiatric/Behavioral: Negative for confusion and suicidal ideas. All other systems reviewed and are negative. Vitals:    11/10/20 0050 11/10/20 0328   BP: 127/64    Pulse: 100    Resp: 16    Temp: 97.6 °F (36.4 °C)    SpO2: 97% 97%            Physical Exam  Vitals signs and nursing note reviewed. Constitutional:       General: He is not in acute distress. Appearance: Normal appearance. He is well-developed. He is not ill-appearing or diaphoretic. HENT:      Head: Normocephalic and atraumatic. Right Ear: External ear normal.      Left Ear: External ear normal.   Eyes:      General: No scleral icterus. Right eye: No discharge. Left eye: No discharge. Extraocular Movements: Extraocular movements intact. Conjunctiva/sclera: Conjunctivae normal.      Pupils: Pupils are equal, round, and reactive to light. Neck:      Musculoskeletal: Normal range of motion and neck supple. Vascular: No JVD. Trachea: No tracheal deviation. Cardiovascular:      Rate and Rhythm: Normal rate and regular rhythm. Heart sounds: Normal heart sounds. No murmur. No friction rub. No gallop. Pulmonary:      Effort: Pulmonary effort is normal. No respiratory distress. Breath sounds: Normal breath sounds. No stridor. No decreased breath sounds, wheezing, rhonchi or rales. Chest:      Chest wall: No tenderness. Abdominal:      General: Bowel sounds are normal. There is no distension. Palpations: Abdomen is soft. Tenderness:  There is abdominal tenderness in the right upper quadrant and epigastric area. There is no guarding or rebound. Negative signs include Ruano's sign. Musculoskeletal: Normal range of motion. General: No tenderness. Skin:     General: Skin is warm and dry. Capillary Refill: Capillary refill takes less than 2 seconds. Coloration: Skin is not pale. Findings: No erythema or rash. Neurological:      General: No focal deficit present. Mental Status: He is alert and oriented to person, place, and time. GCS: GCS eye subscore is 4. GCS verbal subscore is 5. GCS motor subscore is 6. Cranial Nerves: No cranial nerve deficit. Sensory: No sensory deficit. Motor: No weakness or abnormal muscle tone. Coordination: Coordination normal.      Deep Tendon Reflexes: Reflexes are normal and symmetric. Reflexes normal.   Psychiatric:         Mood and Affect: Mood normal.         Behavior: Behavior normal.         Thought Content: Thought content normal.         Judgment: Judgment normal.          MDM  Number of Diagnoses or Management Options  Gall stones:   RUQ abdominal pain:      Amount and/or Complexity of Data Reviewed  Clinical lab tests: ordered and reviewed  Tests in the radiology section of CPT®: ordered and reviewed    Risk of Complications, Morbidity, and/or Mortality  Presenting problems: moderate  Diagnostic procedures: moderate  Management options: moderate    Patient Progress  Patient progress: stable       Procedures    Chief Complaint   Patient presents with    Back Pain       The patient's presenting problems have been discussed, and they are in agreement with the care plan formulated and outlined with them. I have encouraged them to ask questions as they arise throughout their visit.     MEDICATIONS GIVEN:  Medications   sodium chloride (NS) flush 5-40 mL (10 mL IntraVENous Given 11/10/20 0224)   sodium chloride (NS) flush 5-40 mL (has no administration in time range)   ketorolac (TORADOL) injection 30 mg (30 mg IntraVENous Given 11/10/20 0223)       LABS REVIEWED:  Recent Results (from the past 24 hour(s))   CBC WITH AUTOMATED DIFF    Collection Time: 11/10/20  2:12 AM   Result Value Ref Range    WBC 9.0 4.1 - 11.1 K/uL    RBC 4.84 4.10 - 5.70 M/uL    HGB 14.8 12.1 - 17.0 g/dL    HCT 42.5 36.6 - 50.3 %    MCV 87.8 80.0 - 99.0 FL    MCH 30.6 26.0 - 34.0 PG    MCHC 34.8 30.0 - 36.5 g/dL    RDW 11.6 11.5 - 14.5 %    PLATELET 448 297 - 890 K/uL    MPV 13.0 (H) 8.9 - 12.9 FL    NRBC 0.0 0  WBC    ABSOLUTE NRBC 0.00 0.00 - 0.01 K/uL    NEUTROPHILS 67 32 - 75 %    LYMPHOCYTES 20 12 - 49 %    MONOCYTES 9 5 - 13 %    EOSINOPHILS 3 0 - 7 %    BASOPHILS 1 0 - 1 %    IMMATURE GRANULOCYTES 0 0.0 - 0.5 %    ABS. NEUTROPHILS 6.1 1.8 - 8.0 K/UL    ABS. LYMPHOCYTES 1.8 0.8 - 3.5 K/UL    ABS. MONOCYTES 0.8 0.0 - 1.0 K/UL    ABS. EOSINOPHILS 0.3 0.0 - 0.4 K/UL    ABS. BASOPHILS 0.1 0.0 - 0.1 K/UL    ABS. IMM. GRANS. 0.0 0.00 - 0.04 K/UL    DF AUTOMATED     METABOLIC PANEL, COMPREHENSIVE    Collection Time: 11/10/20  2:12 AM   Result Value Ref Range    Sodium 138 136 - 145 mmol/L    Potassium 3.6 3.5 - 5.1 mmol/L    Chloride 106 97 - 108 mmol/L    CO2 27 21 - 32 mmol/L    Anion gap 5 5 - 15 mmol/L    Glucose 116 (H) 65 - 100 mg/dL    BUN 12 6 - 20 MG/DL    Creatinine 1.00 0.70 - 1.30 MG/DL    BUN/Creatinine ratio 12 12 - 20      GFR est AA >60 >60 ml/min/1.73m2    GFR est non-AA >60 >60 ml/min/1.73m2    Calcium 8.4 (L) 8.5 - 10.1 MG/DL    Bilirubin, total 0.5 0.2 - 1.0 MG/DL    ALT (SGPT) 46 12 - 78 U/L    AST (SGOT) 22 15 - 37 U/L    Alk.  phosphatase 88 45 - 117 U/L    Protein, total 7.0 6.4 - 8.2 g/dL    Albumin 3.4 (L) 3.5 - 5.0 g/dL    Globulin 3.6 2.0 - 4.0 g/dL    A-G Ratio 0.9 (L) 1.1 - 2.2     LIPASE    Collection Time: 11/10/20  2:12 AM   Result Value Ref Range    Lipase 139 73 - 393 U/L   SAMPLES BEING HELD    Collection Time: 11/10/20  2:12 AM   Result Value Ref Range    SAMPLES BEING HELD 1sst,1blu,1red,1drkgrn     COMMENT Add-on orders for these samples will be processed based on acceptable specimen integrity and analyte stability, which may vary by analyte. VITAL SIGNS:  Patient Vitals for the past 24 hrs:   Temp Pulse Resp BP SpO2   11/10/20 0328     97 %   11/10/20 0050 97.6 °F (36.4 °C) 100 16 127/64 97 %       RADIOLOGY RESULTS:  The following have been ordered and reviewed:  [facility]    Result Date: 11/10/2020  Clinical history: ruq, back pain INDICATION:   ruq, back pain COMPARISON: 9/22/2020 CT FINDINGS: Right upper quadrant ultrasonographic images of the abdomen were obtained using a curved array transducer. GALLBLADDER: There is gallbladder sludge and cholelithiasis. No pericholecystic edema or fluid. COMMON DUCT: 4 millimeters in diameter. No visualized calculi. Josef Kins LIVER: Increased in echogenicity. No duct dilatation. No mass lesion. MAIN PORTAL VEIN: Patent. Appropriate hepatopetal flow. PANCREAS: Incompletely visualized secondary to overlying bowel gas. No definite mass or ductal dilatation is evident. RIGHT KIDNEY: Normal in size. No hydronephrosis, shadowing calculus, or contour-deforming renal mass. IMPRESSION: Gallbladder sludge and cholelithiasis. Increased overall hepatic echogenicity with multiple scattered hyperechoic foci in the hepatic parenchyma consistent with scattered hemangiomas. These are demonstrated on CT of 9/22/2020       PROGRESS NOTES:  Discussed results and plan with patient. Patient will be discharged home with Surgical follow up. Patient instructed to return to the emergency room for any worsening symptoms or any other concerns. DIAGNOSIS:    1. Gall stones    2.  RUQ abdominal pain        PLAN:  Follow-up Information     Follow up With Specialties Details Why Contact Jordan Zhang MD General Surgery Schedule an appointment as soon as possible for a visit  [id number] [facility]  [phone]      [facility] EMERGENCY DEPT Emergency Medicine  If symptoms worsen [address] [facility]  [phone]        Discharge Medication List as of 11/10/2020  3:54 AM      START taking these medications    Details   dicyclomine (BentyL) 10 mg capsule Take 1 Cap by mouth four (4) times daily as needed for Abdominal Cramps (abd cramps) for up to 5 days. , Normal, Disp-20 Cap,R-0      ondansetron (ZOFRAN ODT) 4 mg disintegrating tablet Take 1 Tab by mouth every eight (8) hours as needed for Nausea., Normal, Disp-20 Tab,R-0         CONTINUE these medications which have CHANGED    Details   HYDROcodone-acetaminophen (Norco) 5-325 mg per tablet Take 1 Tab by mouth every six (6) hours as needed for Pain for up to 5 days. Max Daily Amount: 4 Tabs., Normal, Disp-20 Tab,R-0         CONTINUE these medications which have NOT CHANGED    Details   ibuprofen (MOTRIN) 800 mg tablet Take 1 Tab by mouth every six (6) hours as needed for Pain., Print, Disp-30 Tab, R-0      cyclobenzaprine (FLEXERIL) 10 mg tablet Take 1 Tab by mouth three (3) times daily as needed for Muscle Spasm(s). , Print, Disp-30 Tab, R-0             ED COURSE: The patient's hospital course has been uncomplicated. Please note that this dictation was completed with [employer], the [employer] voice recognition software. Quite often unanticipated grammatical, syntax, homophones, and other interpretive errors are inadvertently transcribed by the computer software. Please disregard these errors. Please excuse any errors that have escaped final proofreading.

## 2020-11-10 NOTE — ED TRIAGE NOTES
Triage: Pt advises that he started having back pain around 2330. Pt avises he took a hydrocodone and  A muscle relaxer before he came.

## 2020-11-10 NOTE — DISCHARGE INSTRUCTIONS
We hope that we have addressed all of your medical concerns. The examination and treatment you received in the Emergency Department were for an emergent problem and were not intended as complete care. It is important that you follow up with your healthcare provider(s) for ongoing care. If your symptoms worsen or do not improve as expected, and you are unable to reach your usual health care provider(s), you should return to the Emergency Department. Today's healthcare is undergoing tremendous change, and patient satisfaction surveys are one of the many tools to assess the quality of medical care. You may receive a survey from the [employer] regarding your experience in the Emergency Department. I hope that your experience has been completely positive, particularly the medical care that I provided. As such, please participate in the survey; anything less than excellent does not meet my expectations or intentions. [address] [facility] and [facility] participate in nationally recognized quality of care measures. If your blood pressure is greater than 120/80, as reported below, we urge that you seek medical care to address the potential of high blood pressure, commonly known as hypertension. Hypertension can be hereditary or can be caused by certain medical conditions, pain, stress, or \"white coat syndrome. \"       Please make an appointment with your health care provider(s) for follow up of your Emergency Department visit. VITALS:   Patient Vitals for the past 8 hrs:   Temp Pulse Resp BP SpO2   11/10/20 0328 -- -- -- -- 97 %   11/10/20 0050 97.6 °F (36.4 °C) 100 16 127/64 97 %          Thank you for allowing us to provide you with medical care today. We realize that you have many choices for your emergency care needs. Please choose us in the future for any continued health care needs. Yue Ruiz, [address] [facility].   Office: [phone]            Recent Results (from the past 24 hour(s))   CBC WITH AUTOMATED DIFF    Collection Time: 11/10/20  2:12 AM   Result Value Ref Range    WBC 9.0 4.1 - 11.1 K/uL    RBC 4.84 4.10 - 5.70 M/uL    HGB 14.8 12.1 - 17.0 g/dL    HCT 42.5 36.6 - 50.3 %    MCV 87.8 80.0 - 99.0 FL    MCH 30.6 26.0 - 34.0 PG    MCHC 34.8 30.0 - 36.5 g/dL    RDW 11.6 11.5 - 14.5 %    PLATELET 950 289 - 791 K/uL    MPV 13.0 (H) 8.9 - 12.9 FL    NRBC 0.0 0  WBC    ABSOLUTE NRBC 0.00 0.00 - 0.01 K/uL    NEUTROPHILS 67 32 - 75 %    LYMPHOCYTES 20 12 - 49 %    MONOCYTES 9 5 - 13 %    EOSINOPHILS 3 0 - 7 %    BASOPHILS 1 0 - 1 %    IMMATURE GRANULOCYTES 0 0.0 - 0.5 %    ABS. NEUTROPHILS 6.1 1.8 - 8.0 K/UL    ABS. LYMPHOCYTES 1.8 0.8 - 3.5 K/UL    ABS. MONOCYTES 0.8 0.0 - 1.0 K/UL    ABS. EOSINOPHILS 0.3 0.0 - 0.4 K/UL    ABS. BASOPHILS 0.1 0.0 - 0.1 K/UL    ABS. IMM. GRANS. 0.0 0.00 - 0.04 K/UL    DF AUTOMATED     METABOLIC PANEL, COMPREHENSIVE    Collection Time: 11/10/20  2:12 AM   Result Value Ref Range    Sodium 138 136 - 145 mmol/L    Potassium 3.6 3.5 - 5.1 mmol/L    Chloride 106 97 - 108 mmol/L    CO2 27 21 - 32 mmol/L    Anion gap 5 5 - 15 mmol/L    Glucose 116 (H) 65 - 100 mg/dL    BUN 12 6 - 20 MG/DL    Creatinine 1.00 0.70 - 1.30 MG/DL    BUN/Creatinine ratio 12 12 - 20      GFR est AA >60 >60 ml/min/1.73m2    GFR est non-AA >60 >60 ml/min/1.73m2    Calcium 8.4 (L) 8.5 - 10.1 MG/DL    Bilirubin, total 0.5 0.2 - 1.0 MG/DL    ALT (SGPT) 46 12 - 78 U/L    AST (SGOT) 22 15 - 37 U/L    Alk.  phosphatase 88 45 - 117 U/L    Protein, total 7.0 6.4 - 8.2 g/dL    Albumin 3.4 (L) 3.5 - 5.0 g/dL    Globulin 3.6 2.0 - 4.0 g/dL    A-G Ratio 0.9 (L) 1.1 - 2.2     LIPASE    Collection Time: 11/10/20  2:12 AM   Result Value Ref Range    Lipase 139 73 - 393 U/L   SAMPLES BEING HELD    Collection Time: 11/10/20  2:12 AM   Result Value Ref Range    SAMPLES BEING HELD 1sst,1blu,1red,1drkgrn COMMENT        Add-on orders for these samples will be processed based on acceptable specimen integrity and analyte stability, which may vary by analyte. [facility]    Result Date: 11/10/2020  Clinical history: ruq, back pain INDICATION:   ruq, back pain COMPARISON: 9/22/2020 CT FINDINGS: Right upper quadrant ultrasonographic images of the abdomen were obtained using a curved array transducer. GALLBLADDER: There is gallbladder sludge and cholelithiasis. No pericholecystic edema or fluid. COMMON DUCT: 4 millimeters in diameter. No visualized calculi. [place] LIVER: Increased in echogenicity. No duct dilatation. No mass lesion. MAIN PORTAL VEIN: Patent. Appropriate hepatopetal flow. PANCREAS: Incompletely visualized secondary to overlying bowel gas. No definite mass or ductal dilatation is evident. RIGHT KIDNEY: Normal in size. No hydronephrosis, shadowing calculus, or contour-deforming renal mass. IMPRESSION: Gallbladder sludge and cholelithiasis. Increased overall hepatic echogenicity with multiple scattered hyperechoic foci in the hepatic parenchyma consistent with scattered hemangiomas. These are demonstrated on CT of 9/22/2020         Patient Education        Abdominal Pain: Care Instructions  Your Care Instructions     Abdominal pain has many possible causes. Some aren't serious and get better on their own in a few days. Others need more testing and treatment. If your pain continues or gets worse, you need to be rechecked and may need more tests to find out what is wrong. You may need surgery to correct the problem. Don't ignore new symptoms, such as fever, nausea and vomiting, urination problems, pain that gets worse, and dizziness. These may be signs of a more serious problem. Your doctor may have recommended a follow-up visit in the next 8 to 12 hours. If you are not getting better, you may need more tests or treatment. The doctor has checked you carefully, but problems can develop later.  If you notice any problems or new symptoms, get medical treatment right away. Follow-up care is a key part of your treatment and safety. Be sure to make and go to all appointments, and call your doctor if you are having problems. It's also a good idea to know your test results and keep a list of the medicines you take. How can you care for yourself at home? · Rest until you feel better. · To prevent dehydration, drink plenty of fluids, enough so that your urine is light yellow or clear like water. Choose water and other caffeine-free clear liquids until you feel better. If you have kidney, heart, or liver disease and have to limit fluids, talk with your doctor before you increase the amount of fluids you drink. · If your stomach is upset, eat mild foods, such as rice, dry toast or crackers, bananas, and applesauce. Try eating several small meals instead of two or three large ones. · Wait until 48 hours after all symptoms have gone away before you have spicy foods, alcohol, and drinks that contain caffeine. · Do not eat foods that are high in fat. · Avoid anti-inflammatory medicines such as aspirin, ibuprofen (Advil, Motrin), and naproxen (Aleve). These can cause stomach upset. Talk to your doctor if you take daily aspirin for another health problem. When should you call for help? Call 911 anytime you think you may need emergency care. For example, call if:    · You passed out (lost consciousness).     · You pass maroon or very bloody stools.     · You vomit blood or what looks like coffee grounds.     · You have new, severe belly pain. Call your doctor now or seek immediate medical care if:    · Your pain gets worse, especially if it becomes focused in one area of your belly.     · You have a new or higher fever.     · Your stools are black and look like tar, or they have streaks of blood.     · You have unexpected vaginal bleeding.     · You have symptoms of a urinary tract infection.  These may include:  ? Pain when you urinate. ? Urinating more often than usual.  ? Blood in your urine.     · You are dizzy or lightheaded, or you feel like you may faint. Watch closely for changes in your health, and be sure to contact your doctor if:    · You are not getting better after 1 day (24 hours). Where can you learn more? Go to http://www.gray.com/  Enter L619 in the search box to learn more about \"Abdominal Pain: Care Instructions. \"  Current as of: June 26, 2019               Content Version: 12.6  © 2573-8813 [employer]. Care instructions adapted under license by [employer] (which disclaims liability or warranty for this information). If you have questions about a medical condition or this instruction, always ask your healthcare professional. [place] [address] any warranty or liability for your use of this information. Patient Education        Biliary Colic: Care Instructions  Your Care Instructions     Biliary (say \"BILL-ee-air-ee\") colic is belly pain caused by gallbladder problems. It is usually caused by a gallstone moving through or blocking the common bile duct or cystic duct. Gallstones are stones that form in the gallbladder. They also can form in the common bile duct or cystic duct. These ducts carry bile from the gallbladder and the liver to the small intestine. Gallstones may be as small as a grain of sand. Or they may be as large as a golf ball. Gallstones that cause severe symptoms usually are treated with surgery to remove the gallbladder. If the first attack of biliary colic is mild, it is often safe to wait until you have had another attack before you think about having surgery. The doctor has checked you carefully. But problems can develop later. If you notice any problems or new symptoms, get medical treatment right away. Follow-up care is a key part of your treatment and safety.  Be sure to make and go to all appointments, and call your doctor if you are having problems. It's also a good idea to know your test results and keep a list of the medicines you take. How can you care for yourself at home? · Take pain medicines exactly as directed. ? If the doctor gave you a prescription medicine for pain, take it as prescribed. ? If you are not taking a prescription pain medicine, ask your doctor if you can take an over-the-counter medicine. Read and follow all instructions on the label. · Avoid foods that cause symptoms, especially fatty foods. These can cause biliary colic. · You may need more tests to look at your gallbladder. When should you call for help? Call your doctor now or seek immediate medical care if:    · You have a fever.     · You have new belly pain, or your pain gets worse.     · There is a new or increasing yellow tint to your skin or the whites of your eyes.     · Your urine is dark yellow-brown, or your stools are light-colored or white.     · You cannot keep down fluids. Watch closely for changes in your health, and be sure to contact your doctor if:    · You do not get better as expected.     · You are not getting better after 1 day (24 hours). Where can you learn more? Go to http://www.gray.com/  Enter [id number] in the search box to learn more about \"Biliary Colic: Care Instructions. \"  Current as of: April 15, 2020               Content Version: 12.6  © 4902-8176 [employer]. Care instructions adapted under license by [employer] (which disclaims liability or warranty for this information). If you have questions about a medical condition or this instruction, always ask your healthcare professional. [place] [address] any warranty or liability for your use of this information.          Patient Education        Low-Fat Diet for Gallbladder Disease: Care Instructions  Your Care Instructions     When you eat, the gallbladder releases bile, which helps you digest the fat in food. If you have an inflamed gallbladder, this may cause pain. A low-fat diet may give your gallbladder a rest so you can start to heal. Your doctor and dietitian can help you make an eating plan that does not irritate your digestive system. Always talk with your doctor or dietitian before you make changes in your diet. Follow-up care is a key part of your treatment and safety. Be sure to make and go to all appointments, and call your doctor if you are having problems. It's also a good idea to know your test results and keep a list of the medicines you take. How can you care for yourself at home? · Eat many small meals and snacks each day instead of three large meals. · Choose lean meats. ? Eat no more than 5 to 6½ ounces of meat a day. ? Cut off all fat you can see. ? Eat chicken and turkey without the skin. ? Many types of fish, such as salmon, lake trout, tuna, and herring, provide healthy omega-3 fat. But, avoid fish canned in oil, such as sardines in olive oil. ? Bake, broil, or grill meats, poultry, or fish instead of frying them in butter or fat. · Drink or eat nonfat or low-fat milk, yogurt, cheese, or other milk products each day. ? Read the labels on cheeses, and choose those with less than 5 grams of fat an ounce. ? Try fat-free sour cream, cream cheese, or yogurt. ? Avoid cream soups and cream sauces on pasta. ? Eat low-fat ice cream, frozen yogurt, or sorbet. Avoid regular ice cream.  · Eat whole-grain cereals, breads, crackers, rice, or pasta. Avoid high-fat foods such as croissants, scones, biscuits, waffles, doughnuts, muffins, granola, and high-fat breads. · Flavor your foods with herbs and spices (such as basil, tarragon, or mint), fat-free sauces, or lemon juice instead of butter. You can also use butter substitutes, fat-free mayonnaise, or fat-free dressing.   · Try applesauce, prune puree, or mashed bananas to replace some or all of the fat when you bake.  · Limit fats and oils, such as butter, margarine, mayonnaise, and salad dressing, to no more than 1 tablespoon a meal.  · Avoid high-fat foods, such as:  ? Chocolate, whole milk, ice cream, and processed cheese. ? Fried or buttered foods. ? Sausage, salami, and bundy. ? Cinnamon rolls, cakes, pies, cookies, and other pastries. ? Prepared snack foods, such as potato chips, nut and granola bars, and mixed nuts. ? Coconut and avocado. · Learn how to read food labels for serving sizes and ingredients. Fast-food and convenience-food meals often have lots of fat. Where can you learn more? Go to http://www.gray.com/  Enter G690 in the search box to learn more about \"Low-Fat Diet for Gallbladder Disease: Care Instructions. \"  Current as of: August 22, 2019               Content Version: 12.6  © 0826-0009 [employer]. Care instructions adapted under license by [employer] (which disclaims liability or warranty for this information). If you have questions about a medical condition or this instruction, always ask your healthcare professional. [place] [address] any warranty or liability for your use of this information.